# Patient Record
Sex: MALE | Race: WHITE | URBAN - NONMETROPOLITAN AREA
[De-identification: names, ages, dates, MRNs, and addresses within clinical notes are randomized per-mention and may not be internally consistent; named-entity substitution may affect disease eponyms.]

---

## 2017-09-25 ENCOUNTER — PATIENT MESSAGE (OUTPATIENT)
Dept: FAMILY MEDICINE CLINIC | Facility: CLINIC | Age: 22
End: 2017-09-25

## 2017-09-25 NOTE — TELEPHONE ENCOUNTER
From: Saba Sarmiento  To: Kwame Pierre MD  Sent: 9/25/2017 10:55 AM CDT  Subject: Other    Good morning,    I need to either fax or email paper work from my apartment that I am getting stating that maddy is my emotional support animal. There is a small s

## 2017-10-04 ENCOUNTER — MED REC SCAN ONLY (OUTPATIENT)
Dept: FAMILY MEDICINE CLINIC | Facility: CLINIC | Age: 22
End: 2017-10-04

## 2018-02-21 ENCOUNTER — TELEPHONE (OUTPATIENT)
Dept: FAMILY MEDICINE CLINIC | Facility: CLINIC | Age: 23
End: 2018-02-21

## 2018-02-22 NOTE — TELEPHONE ENCOUNTER
I can't fill this out, his last OV was 2014!! Sorry this is not going to work, ask providers in [place].

## 2018-02-22 NOTE — TELEPHONE ENCOUNTER
Patient advised that Dr Bassam Olea cannot sign the paper, he will need to have it signed by someone in [place].

## 2018-05-03 ENCOUNTER — MED REC SCAN ONLY (OUTPATIENT)
Dept: FAMILY MEDICINE CLINIC | Facility: CLINIC | Age: 23
End: 2018-05-03

## 2021-10-12 ENCOUNTER — TELEPHONE (OUTPATIENT)
Dept: FAMILY MEDICINE CLINIC | Facility: CLINIC | Age: 26
End: 2021-10-12

## 2021-10-12 NOTE — TELEPHONE ENCOUNTER
Carolyn Mejia would like to speak with Dr Ramy Hills personal phone call about his family member please call

## 2024-03-19 NOTE — TELEPHONE ENCOUNTER
Patient said that Dr Lidya Madsen gave him a note on 8/16/17 for the dog. He said he just needs it signed. There are no Wet Read(s) to document.

## 2025-04-21 ENCOUNTER — PATIENT OUTREACH (OUTPATIENT)
Dept: CASE MANAGEMENT | Age: 30
End: 2025-04-21

## 2025-04-21 NOTE — PROCEDURES
The office order for PCP removal request is Approved and finalized on April 21, 2025.    Removed Demi Gray MD as the patient's Primary Care Physician

## (undated) NOTE — LETTER
Date: 9/26/2017    Patient Name: Al Tucker          To Whom it may concern: This letter has been written at the patient's request. The above patient was seen at the [facility] for treatment of a medical condition.     This patient ha